# Patient Record
(demographics unavailable — no encounter records)

---

## 2025-04-28 NOTE — REVIEW OF SYSTEMS
[Appropriate Age Development] : development appropriate for age [Change in Activity] : no change in activity [Fever Above 102] : no fever [Malaise] : no malaise [Rash] : no rash [Redness] : no redness [Wheezing] : no wheezing [Cough] : no cough [Shortness of Breath] : no shortness of breath [Congestion] : no congestion [Vomiting] : no vomiting [Diarrhea] : no diarrhea [Constipation] : no constipation [Kidney Infection] : no kidney infection [Bladder Infection] : no bladder infection [Limping] : no limping [Joint Pains] : no arthralgias [Joint Swelling] : no joint swelling [Sleep Disturbances] : ~T no sleep disturbances

## 2025-04-28 NOTE — PHYSICAL EXAM
[FreeTextEntry1] : General: healthy appearing HEENT: NCAT, Normal conjunctiva Cardio: Appears well perfused, no peripheral edema, brisk cap refill.  Lungs: no obvious increased WOB, no audible wheeze heard without use of stethoscope.  Abdomen: not examined.  Skin: No visible rashes on exposed skin  Moving all extremities spontaneously.  No obvious deformities of upper  extremities. Upper and lower extremities symmetric with normal ROM bilaterally.  The child has full range of motion of bilateral hips, knees, ankles, and feet.  Wide and symmetric abduction of the hips. stable. Negative Miller, Negative Ortolani.  Physiologic tibia vara noted bilateral Right foot/ankle good overall alignment. DF +40 and PF +40. Left foot/ankle: mild MA noted, flexible improved from prior visit. slight medial crease noted. DF +30-40 degrees and PF +40 degrees.  At rest the left foot has a tendency to be in equinovarus position, but corrects easily.  No L knee effusion. No bruising or discoloration.  WWP distally, brisk cap refill of toes and fingers.

## 2025-04-28 NOTE — DATA REVIEWED
[de-identified] : Xrays of L knee taken in 3 views including AP/lateral/ and sunrise; xrays reviewed: There is no evidence of fracture, dislocation, or other deformity.  No evidence of radiopaque loose body.  No large knee joint effusion.  No OCD lesion.  Patella appears well reduced within the trochlea.  Distal femoral and proximal tibial physes are open.  Xrays of Pelvis taken in 3 views including AP/lateral (frog); xrays taken and independently reviewed: Good overall alignment and symmetry of femoral heads b/l. B/l Shenton's line intact. Femoral head ossification seen b/l.

## 2025-04-28 NOTE — DEVELOPMENTAL MILESTONES
[See scanned document for history] : See scanned document for history [Not Yet Determined] : not yet determined

## 2025-04-28 NOTE — ASSESSMENT
[FreeTextEntry1] : Occasional left knee pain  3 month old male with pmhx of left mild metatarsus adductus now c/o left knee pain for the past year.  -We discussed physical exam and imaging of knee and pelvis at length during today's visit with patient and his parent/guardian who served as an independent historian due to child's age and unreliable nature of history. -Xrays of L knee taken in 3 views including AP/lateral/ and sunrise; xrays reviewed: There is no evidence of fracture, dislocation, or other deformity.  No evidence of radiopaque loose body.  No large knee joint effusion.  No OCD lesion.  Patella appears well reduced within the trochlea.  Distal femoral and proximal tibial physes are open. Xrays of Pelvis taken in 3 views including AP/lateral (frog); xrays taken and independently reviewed: Good overall alignment and symmetry of femoral heads b/l. B/l Shenton's line intact. Femoral head ossification seen b/l.  -His knee and pelvis xray is normal so there is no concern of new injury at this time -Clinically he has mild metatarsus adductus which continues to improve from prior visits. Physical exam of left knee is normal. Continued tylenol, observation, and stretching is recommended. Sample stretching exercises were demonstrated today. -No activity restrictions. -They should follow up in 6 months for repeat clinical evaluation and referral for possible left knee MRI. No radiographs. Will come back for any new or worsening pain.    All questions and concerns were addressed today. Parent and patient verbalize understanding and agree with plan of care.  IMichelle PA-C, acted as scribe for Dr. Villegas for today's encounter.

## 2025-04-28 NOTE — HISTORY OF PRESENT ILLNESS
[FreeTextEntry1] : Patient is a 1 yo M with a pmhx of mild metatarsus adductus who is c/o new left knee pain for the six months. Parents state that when they lift him up or if the patient jumps he starts complaining of mild pain in his left knee. This has been happening intermittently for the past six months. They sometimes try Tylenol for the pain which usually helps. The pain gets better when he lays down or relaxes and stays in his knee. He has been moving both lower extremities equally. Denies any injury to the knee. No swelling or bruising.  [Stable] : stable [___ mths] : [unfilled] month(s) ago [2] : currently ~his/her~ pain is 2 out of 10 [Intermit.] : ~He/She~ states the symptoms seem to be intermittent [Bending] : not exacerbated by bending [Direct Pressure] : not exacerbated by direct pressure [Joint Movement] : not exacerbated by joint  movement [Running] : not exacerbated by running [Sitting] : not exacerbated by sitting [Acetaminophen] : relieved by acetaminophen [NSAIDs] : relieved by nonsteroidal anti-inflammatory drugs [Recumbency] : relieved by recumbency [Rest] : relieved by rest [de-identified] : jumping

## 2025-04-28 NOTE — BIRTH HISTORY
[Unremarkable] : Unremarkable [Duration: ___ wks] : duration: [unfilled] weeks [] :  [Normal?] : normal delivery [Was child in NICU?] : Child was in NICU